# Patient Record
Sex: FEMALE | Race: WHITE | NOT HISPANIC OR LATINO
[De-identification: names, ages, dates, MRNs, and addresses within clinical notes are randomized per-mention and may not be internally consistent; named-entity substitution may affect disease eponyms.]

---

## 2017-12-13 ENCOUNTER — APPOINTMENT (OUTPATIENT)
Dept: MAMMOGRAPHY | Facility: CLINIC | Age: 67
End: 2017-12-13
Payer: MEDICARE

## 2017-12-13 ENCOUNTER — OUTPATIENT (OUTPATIENT)
Dept: OUTPATIENT SERVICES | Facility: HOSPITAL | Age: 67
LOS: 1 days | End: 2017-12-13
Payer: MEDICARE

## 2017-12-13 DIAGNOSIS — Z12.31 ENCOUNTER FOR SCREENING MAMMOGRAM FOR MALIGNANT NEOPLASM OF BREAST: ICD-10-CM

## 2017-12-13 PROBLEM — Z00.00 ENCOUNTER FOR PREVENTIVE HEALTH EXAMINATION: Status: ACTIVE | Noted: 2017-12-13

## 2017-12-13 PROCEDURE — 77063 BREAST TOMOSYNTHESIS BI: CPT | Mod: 26

## 2017-12-13 PROCEDURE — 77063 BREAST TOMOSYNTHESIS BI: CPT

## 2017-12-13 PROCEDURE — 77067 SCR MAMMO BI INCL CAD: CPT

## 2017-12-13 PROCEDURE — G0202: CPT | Mod: 26

## 2018-04-26 ENCOUNTER — APPOINTMENT (OUTPATIENT)
Dept: RHEUMATOLOGY | Facility: CLINIC | Age: 68
End: 2018-04-26
Payer: COMMERCIAL

## 2018-04-26 VITALS
OXYGEN SATURATION: 94 % | SYSTOLIC BLOOD PRESSURE: 104 MMHG | RESPIRATION RATE: 18 BRPM | TEMPERATURE: 98.3 F | WEIGHT: 104 LBS | HEIGHT: 60 IN | DIASTOLIC BLOOD PRESSURE: 70 MMHG | BODY MASS INDEX: 20.42 KG/M2 | HEART RATE: 79 BPM

## 2018-04-26 DIAGNOSIS — Z82.5 FAMILY HISTORY OF ASTHMA AND OTHER CHRONIC LOWER RESPIRATORY DISEASES: ICD-10-CM

## 2018-04-26 DIAGNOSIS — F17.200 NICOTINE DEPENDENCE, UNSPECIFIED, UNCOMPLICATED: ICD-10-CM

## 2018-04-26 DIAGNOSIS — Z83.3 FAMILY HISTORY OF DIABETES MELLITUS: ICD-10-CM

## 2018-04-26 PROCEDURE — 36415 COLL VENOUS BLD VENIPUNCTURE: CPT

## 2018-04-26 PROCEDURE — 99205 OFFICE O/P NEW HI 60 MIN: CPT | Mod: 25

## 2018-05-17 ENCOUNTER — APPOINTMENT (OUTPATIENT)
Dept: RHEUMATOLOGY | Facility: CLINIC | Age: 68
End: 2018-05-17
Payer: COMMERCIAL

## 2018-05-17 VITALS
SYSTOLIC BLOOD PRESSURE: 130 MMHG | RESPIRATION RATE: 16 BRPM | DIASTOLIC BLOOD PRESSURE: 80 MMHG | TEMPERATURE: 98.3 F | OXYGEN SATURATION: 97 % | HEART RATE: 65 BPM

## 2018-05-17 DIAGNOSIS — R89.9 UNSPECIFIED ABNORMAL FINDING IN SPECIMENS FROM OTHER ORGANS, SYSTEMS AND TISSUES: ICD-10-CM

## 2018-05-17 DIAGNOSIS — Z51.81 ENCOUNTER FOR THERAPEUTIC DRUG LVL MONITORING: ICD-10-CM

## 2018-05-17 DIAGNOSIS — H57.89 OTHER SPECIFIED DISORDERS OF EYE AND ADNEXA: ICD-10-CM

## 2018-05-17 DIAGNOSIS — G47.8 OTHER SLEEP DISORDERS: ICD-10-CM

## 2018-05-17 LAB
24R-OH-CALCIDIOL SERPL-MCNC: 50.2 PG/ML
25(OH)D3 SERPL-MCNC: 42.1 NG/ML
A PHAGOCYTOPH IGG TITR SER IF: NORMAL TITER
ACE BLD-CCNC: 45 U/L
ALBUMIN SERPL ELPH-MCNC: 4.5 G/DL
ALP BLD-CCNC: 88 U/L
ALT SERPL-CCNC: 15 U/L
ANA PAT FLD IF-IMP: ABNORMAL
ANA SER IF-ACNC: ABNORMAL
ANION GAP SERPL CALC-SCNC: 13 MMOL/L
APPEARANCE: CLEAR
AST SERPL-CCNC: 20 U/L
B BURGDOR AB SER QL IA: NEGATIVE
B BURGDOR AB SER-IMP: POSITIVE
B BURGDOR IGM PATRN SER IB-IMP: POSITIVE
B BURGDOR18/20KD IGM SER QL IB: NORMAL
B BURGDOR18KD IGG SER QL IB: PRESENT
B BURGDOR23KD IGG SER QL IB: NORMAL
B BURGDOR23KD IGM SER QL IB: PRESENT
B BURGDOR28KD AB SER QL IB: NORMAL
B BURGDOR28KD IGG SER QL IB: PRESENT
B BURGDOR30KD AB SER QL IB: NORMAL
B BURGDOR30KD IGG SER QL IB: NORMAL
B BURGDOR31KD IGG SER QL IB: NORMAL
B BURGDOR31KD IGM SER QL IB: NORMAL
B BURGDOR39KD IGG SER QL IB: PRESENT
B BURGDOR39KD IGM SER QL IB: NORMAL
B BURGDOR41KD IGG SER QL IB: NORMAL
B BURGDOR41KD IGM SER QL IB: PRESENT
B BURGDOR45KD AB SER QL IB: NORMAL
B BURGDOR45KD IGG SER QL IB: NORMAL
B BURGDOR58KD AB SER QL IB: NORMAL
B BURGDOR58KD IGG SER QL IB: PRESENT
B BURGDOR66KD IGG SER QL IB: PRESENT
B BURGDOR66KD IGM SER QL IB: NORMAL
B BURGDOR93KD IGG SER QL IB: NORMAL
B BURGDOR93KD IGM SER QL IB: NORMAL
B MICROTI IGG TITR SER: NORMAL TITER
BACTERIA: ABNORMAL
BASOPHILS # BLD AUTO: 0.02 K/UL
BASOPHILS NFR BLD AUTO: 0.4 %
BILIRUB SERPL-MCNC: 0.7 MG/DL
BILIRUBIN URINE: NEGATIVE
BLOOD URINE: NEGATIVE
BUN SERPL-MCNC: 12 MG/DL
C3 SERPL-MCNC: 98 MG/DL
C4 SERPL-MCNC: 21 MG/DL
CALCIUM SERPL-MCNC: 9.2 MG/DL
CCP AB SER IA-ACNC: <8 UNITS
CENTROMERE IGG SER-ACNC: <0.2 AL
CHLORIDE SERPL-SCNC: 103 MMOL/L
CHROMATIN AB SERPL-ACNC: 0.2 AL
CO2 SERPL-SCNC: 29 MMOL/L
COLOR: YELLOW
CREAT SERPL-MCNC: 0.68 MG/DL
CRP SERPL-MCNC: <0.2 MG/DL
DEPRECATED KAPPA LC FREE/LAMBDA SER: 0.78 RATIO
DSDNA AB SER-ACNC: 26 IU/ML
E CHAFFEENSIS IGG TITR SER IF: NORMAL TITER
ENA JO1 AB SER IA-ACNC: <0.2 AL
ENA RNP AB SER IA-ACNC: 1.7 AL
ENA SCL70 IGG SER IA-ACNC: 1.8 AL
ENA SM AB SER IA-ACNC: 1.9 AL
ENA SS-A AB SER IA-ACNC: <0.2 AL
ENA SS-B AB SER IA-ACNC: <0.2 AL
EOSINOPHIL # BLD AUTO: 0.05 K/UL
EOSINOPHIL NFR BLD AUTO: 1.1 %
ERYTHROCYTE [SEDIMENTATION RATE] IN BLOOD BY WESTERGREN METHOD: 12 MM/HR
GLUCOSE QUALITATIVE U: NEGATIVE MG/DL
GLUCOSE SERPL-MCNC: 62 MG/DL
HCT VFR BLD CALC: 41 %
HGB BLD-MCNC: 13.4 G/DL
HLA-B27 RELATED AG QL: NORMAL
HYALINE CASTS: 0 /LPF
IGA SER QL IEP: 222 MG/DL
IGG SER QL IEP: 880 MG/DL
IGM SER QL IEP: 82 MG/DL
IMM GRANULOCYTES NFR BLD AUTO: 0.2 %
KAPPA LC CSF-MCNC: 2.09 MG/DL
KAPPA LC SERPL-MCNC: 1.62 MG/DL
KETONES URINE: NEGATIVE
LEUKOCYTE ESTERASE URINE: NEGATIVE
LYMPHOCYTES # BLD AUTO: 1.34 K/UL
LYMPHOCYTES NFR BLD AUTO: 28.2 %
MAN DIFF?: NORMAL
MCHC RBC-ENTMCNC: 30.6 PG
MCHC RBC-ENTMCNC: 32.7 GM/DL
MCV RBC AUTO: 93.6 FL
MICROSCOPIC-UA: NORMAL
MONOCYTES # BLD AUTO: 0.32 K/UL
MONOCYTES NFR BLD AUTO: 6.7 %
NEUTROPHILS # BLD AUTO: 3.02 K/UL
NEUTROPHILS NFR BLD AUTO: 63.4 %
NITRITE URINE: NEGATIVE
PH URINE: 6.5
PLATELET # BLD AUTO: 175 K/UL
POTASSIUM SERPL-SCNC: 4.4 MMOL/L
PROT SERPL-MCNC: 7.1 G/DL
PROTEIN URINE: NEGATIVE MG/DL
RBC # BLD: 4.38 M/UL
RBC # FLD: 12.3 %
RED BLOOD CELLS URINE: 1 /HPF
RF+CCP IGG SER-IMP: NEGATIVE
RHEUMATOID FACT SER QL: 7 IU/ML
RIBOSOMAL P AB SER IA-ACNC: <0.2 AL
RNA POLYMERASE III IGG: 12.2 U
SODIUM SERPL-SCNC: 145 MMOL/L
SPECIFIC GRAVITY URINE: 1.02
SQUAMOUS EPITHELIAL CELLS: 8 /HPF
SSDNA AB SER-ACNC: 115 U/ML
THYROGLOB AB SERPL-ACNC: <20 IU/ML
THYROPEROXIDASE AB SERPL IA-ACNC: 13.2 IU/ML
URATE SERPL-MCNC: 1.7 MG/DL
UROBILINOGEN URINE: NEGATIVE MG/DL
WBC # FLD AUTO: 4.76 K/UL
WHITE BLOOD CELLS URINE: 1 /HPF

## 2018-05-17 PROCEDURE — 99406 BEHAV CHNG SMOKING 3-10 MIN: CPT

## 2018-05-17 PROCEDURE — 99215 OFFICE O/P EST HI 40 MIN: CPT | Mod: 25

## 2018-05-21 PROBLEM — Z51.81 ENCOUNTER FOR THERAPEUTIC DRUG MONITORING: Status: ACTIVE | Noted: 2018-05-21

## 2018-06-08 ENCOUNTER — EMERGENCY (EMERGENCY)
Facility: HOSPITAL | Age: 68
LOS: 1 days | Discharge: HOME | End: 2018-06-08
Attending: EMERGENCY MEDICINE
Payer: COMMERCIAL

## 2018-06-08 VITALS — WEIGHT: 100.09 LBS | HEIGHT: 60 IN

## 2018-06-08 VITALS
TEMPERATURE: 98 F | HEART RATE: 78 BPM | SYSTOLIC BLOOD PRESSURE: 137 MMHG | OXYGEN SATURATION: 96 % | DIASTOLIC BLOOD PRESSURE: 81 MMHG | RESPIRATION RATE: 18 BRPM

## 2018-06-08 PROCEDURE — 99283 EMERGENCY DEPT VISIT LOW MDM: CPT

## 2018-06-08 RX ADMIN — Medication 40 MILLIGRAM(S): at 10:50

## 2018-06-08 NOTE — ED PROVIDER NOTE - ATTENDING CONTRIBUTION TO CARE
swelling of eyes for the past 2 days, itchy rash: attributed to hydroxychlor which was started 4 weeks ago.  Reports increased swelling around cheek and eyes today.  denies tongue or lip swelling, denies diff breathing.  using benadryl gel with some relief.  PE:  nontoxic appearing, NARD, no lip or tongue swelliing, no wheezing, NARD.  whitley periorbital swelling.  A/P likely allergic reaction:  steroids and antihistamines.

## 2018-06-08 NOTE — ED PROVIDER NOTE - PLAN OF CARE
resolution of facial swelling complete course of oral steriods and follow up with Rheumatologist. Stop taking prescribed Hydroxychloroquine. Follow up with both PMD within a week of discharge

## 2018-06-08 NOTE — ED PROVIDER NOTE - MEDICAL DECISION MAKING DETAILS
67 year old female with likely medication induced allergic reaction. Will give 40 mg of PO steroids and discharge on 3 day course then discharge with instructions to follow up with Rheum

## 2018-06-08 NOTE — ED PROVIDER NOTE - OBJECTIVE STATEMENT
67 year old female pt with numerous autoimmune disorders presents today c/o of 3 day hx of facial swelling, itching and minor pain. Pt states she recently started on hydroxychloroquine approx 3 weeks ago. No other triggers. Denies any recent changes in lotions, detergents, soaps, food intolerances, sob, chest pain, throat closing, chest tightness, nausea or vomiting.

## 2018-06-08 NOTE — ED PROVIDER NOTE - CARE PLAN
Principal Discharge DX:	Allergic reaction to drug, initial encounter  Goal:	resolution of facial swelling  Assessment and plan of treatment:	complete course of oral steriods and follow up with Rheumatologist. Stop taking prescribed Hydroxychloroquine. Follow up with both PMD within a week of discharge

## 2018-06-08 NOTE — ED ADULT NURSE NOTE - OBJECTIVE STATEMENT
Patient reports facial swelling, burning, redness and puffness, x 3days , started new medication about 2 weeks go, airway patient, cant not take benadryl.

## 2018-07-01 ENCOUNTER — MOBILE ON CALL (OUTPATIENT)
Age: 68
End: 2018-07-01

## 2018-07-02 ENCOUNTER — APPOINTMENT (OUTPATIENT)
Dept: PULMONOLOGY | Facility: CLINIC | Age: 68
End: 2018-07-02
Payer: COMMERCIAL

## 2018-07-02 ENCOUNTER — APPOINTMENT (OUTPATIENT)
Dept: RHEUMATOLOGY | Facility: CLINIC | Age: 68
End: 2018-07-02
Payer: COMMERCIAL

## 2018-07-02 VITALS
OXYGEN SATURATION: 91 % | SYSTOLIC BLOOD PRESSURE: 120 MMHG | DIASTOLIC BLOOD PRESSURE: 76 MMHG | TEMPERATURE: 98.6 F | HEART RATE: 56 BPM | RESPIRATION RATE: 14 BRPM

## 2018-07-02 VITALS
HEART RATE: 56 BPM | SYSTOLIC BLOOD PRESSURE: 120 MMHG | RESPIRATION RATE: 14 BRPM | OXYGEN SATURATION: 91 % | DIASTOLIC BLOOD PRESSURE: 76 MMHG | TEMPERATURE: 98.6 F

## 2018-07-02 DIAGNOSIS — R05 COUGH: ICD-10-CM

## 2018-07-02 DIAGNOSIS — R06.02 SHORTNESS OF BREATH: ICD-10-CM

## 2018-07-02 DIAGNOSIS — J44.9 CHRONIC OBSTRUCTIVE PULMONARY DISEASE, UNSPECIFIED: ICD-10-CM

## 2018-07-02 DIAGNOSIS — R53.82 CHRONIC FATIGUE, UNSPECIFIED: ICD-10-CM

## 2018-07-02 PROCEDURE — 99204 OFFICE O/P NEW MOD 45 MIN: CPT

## 2018-07-02 PROCEDURE — 99215 OFFICE O/P EST HI 40 MIN: CPT | Mod: 25

## 2018-07-02 PROCEDURE — 36415 COLL VENOUS BLD VENIPUNCTURE: CPT

## 2018-07-04 ENCOUNTER — MOBILE ON CALL (OUTPATIENT)
Age: 68
End: 2018-07-04

## 2018-07-25 PROBLEM — L93.0 DISCOID LUPUS ERYTHEMATOSUS: Chronic | Status: ACTIVE | Noted: 2018-06-08

## 2018-07-26 ENCOUNTER — APPOINTMENT (OUTPATIENT)
Dept: OPHTHALMOLOGY | Facility: CLINIC | Age: 68
End: 2018-07-26
Payer: COMMERCIAL

## 2018-07-26 DIAGNOSIS — Z51.81 ENCOUNTER FOR THERAPEUTIC DRUG LVL MONITORING: ICD-10-CM

## 2018-07-26 PROCEDURE — 92134 CPTRZ OPH DX IMG PST SGM RTA: CPT

## 2018-07-26 PROCEDURE — 92002 INTRM OPH EXAM NEW PATIENT: CPT

## 2018-07-26 PROCEDURE — 92083 EXTENDED VISUAL FIELD XM: CPT

## 2018-07-31 ENCOUNTER — APPOINTMENT (OUTPATIENT)
Dept: RHEUMATOLOGY | Facility: CLINIC | Age: 68
End: 2018-07-31
Payer: COMMERCIAL

## 2018-07-31 VITALS
DIASTOLIC BLOOD PRESSURE: 80 MMHG | OXYGEN SATURATION: 96 % | RESPIRATION RATE: 15 BRPM | SYSTOLIC BLOOD PRESSURE: 117 MMHG | HEART RATE: 86 BPM

## 2018-07-31 DIAGNOSIS — Z79.899 OTHER LONG TERM (CURRENT) DRUG THERAPY: ICD-10-CM

## 2018-07-31 DIAGNOSIS — H65.01 ACUTE SEROUS OTITIS MEDIA, RIGHT EAR: ICD-10-CM

## 2018-07-31 PROCEDURE — 99215 OFFICE O/P EST HI 40 MIN: CPT

## 2018-07-31 RX ORDER — PREDNISONE 10 MG/1
10 TABLET ORAL
Qty: 20 | Refills: 0 | Status: DISCONTINUED | COMMUNITY
Start: 2018-07-01 | End: 2018-07-31

## 2018-07-31 RX ORDER — ATORVASTATIN CALCIUM 10 MG/1
10 TABLET, FILM COATED ORAL
Refills: 0 | Status: ACTIVE | COMMUNITY

## 2018-08-07 PROBLEM — Z79.899 LONG-TERM USE OF PLAQUENIL: Status: ACTIVE | Noted: 2018-07-26

## 2018-10-26 ENCOUNTER — RX RENEWAL (OUTPATIENT)
Age: 68
End: 2018-10-26

## 2018-12-04 ENCOUNTER — APPOINTMENT (OUTPATIENT)
Dept: OPHTHALMOLOGY | Facility: CLINIC | Age: 68
End: 2018-12-04
Payer: COMMERCIAL

## 2018-12-04 PROCEDURE — 92012 INTRM OPH EXAM EST PATIENT: CPT | Mod: 25

## 2018-12-04 PROCEDURE — 68761 CLOSE TEAR DUCT OPENING: CPT | Mod: E2,E4

## 2018-12-06 ENCOUNTER — APPOINTMENT (OUTPATIENT)
Dept: RHEUMATOLOGY | Facility: CLINIC | Age: 68
End: 2018-12-06

## 2018-12-28 ENCOUNTER — APPOINTMENT (OUTPATIENT)
Dept: RHEUMATOLOGY | Facility: CLINIC | Age: 68
End: 2018-12-28
Payer: COMMERCIAL

## 2018-12-28 VITALS
SYSTOLIC BLOOD PRESSURE: 102 MMHG | WEIGHT: 99 LBS | DIASTOLIC BLOOD PRESSURE: 60 MMHG | HEIGHT: 60 IN | OXYGEN SATURATION: 92 % | BODY MASS INDEX: 19.44 KG/M2 | HEART RATE: 65 BPM | TEMPERATURE: 98.1 F

## 2018-12-28 PROCEDURE — 99215 OFFICE O/P EST HI 40 MIN: CPT | Mod: 25

## 2018-12-28 PROCEDURE — 36415 COLL VENOUS BLD VENIPUNCTURE: CPT

## 2018-12-28 RX ORDER — HYDROXYCHLOROQUINE SULFATE 200 MG/1
200 TABLET, FILM COATED ORAL DAILY
Qty: 30 | Refills: 3 | Status: DISCONTINUED | COMMUNITY
Start: 2018-05-17 | End: 2018-12-28

## 2018-12-28 RX ORDER — CIPROFLOXACIN AND DEXAMETHASONE 3; 1 MG/ML; MG/ML
0.3-0.1 SUSPENSION/ DROPS AURICULAR (OTIC)
Qty: 1 | Refills: 0 | Status: DISCONTINUED | COMMUNITY
Start: 2018-07-31 | End: 2018-12-28

## 2018-12-28 RX ORDER — FLUOROMETHOLONE 1 MG/ML
0.1 SOLUTION/ DROPS OPHTHALMIC 3 TIMES DAILY
Qty: 1 | Refills: 1 | Status: DISCONTINUED | COMMUNITY
Start: 2018-07-26 | End: 2018-12-28

## 2019-01-17 ENCOUNTER — APPOINTMENT (OUTPATIENT)
Dept: OPHTHALMOLOGY | Facility: CLINIC | Age: 69
End: 2019-01-17
Payer: COMMERCIAL

## 2019-01-17 DIAGNOSIS — H01.02A SQUAMOUS BLEPHARITIS RIGHT EYE, UPPER AND LOWER EYELIDS: ICD-10-CM

## 2019-01-17 DIAGNOSIS — H01.02B SQUAMOUS BLEPHARITIS RIGHT EYE, UPPER AND LOWER EYELIDS: ICD-10-CM

## 2019-01-17 PROCEDURE — 92012 INTRM OPH EXAM EST PATIENT: CPT | Mod: 25

## 2019-01-17 PROCEDURE — 68761 CLOSE TEAR DUCT OPENING: CPT | Mod: E1,E3

## 2019-01-22 PROBLEM — H01.02A SQUAMOUS BLEPHARITIS OF UPPER AND LOWER EYELIDS OF BOTH EYES: Status: ACTIVE | Noted: 2018-07-26

## 2019-01-24 LAB
ALBUMIN MFR SERPL ELPH: 61.3 %
ALBUMIN MFR SERPL ELPH: 62.2 %
ALBUMIN SERPL ELPH-MCNC: 4.3 G/DL
ALBUMIN SERPL-MCNC: 4.2 G/DL
ALBUMIN SERPL-MCNC: 4.5 G/DL
ALBUMIN/GLOB SERPL: 1.6 RATIO
ALBUMIN/GLOB SERPL: 1.7 RATIO
ALP BLD-CCNC: 82 U/L
ALPHA1 GLOB MFR SERPL ELPH: 4 %
ALPHA1 GLOB MFR SERPL ELPH: 4.1 %
ALPHA1 GLOB SERPL ELPH-MCNC: 0.3 G/DL
ALPHA1 GLOB SERPL ELPH-MCNC: 0.3 G/DL
ALPHA2 GLOB MFR SERPL ELPH: 10.5 %
ALPHA2 GLOB MFR SERPL ELPH: 11.3 %
ALPHA2 GLOB SERPL ELPH-MCNC: 0.7 G/DL
ALPHA2 GLOB SERPL ELPH-MCNC: 0.8 G/DL
ALT SERPL-CCNC: 18 U/L
ANCA AB SER-IMP: ABNORMAL
ANION GAP SERPL CALC-SCNC: 11 MMOL/L
APPEARANCE: CLEAR
APPEARANCE: CLEAR
AST SERPL-CCNC: 22 U/L
B-GLOBULIN MFR SERPL ELPH: 10.9 %
B-GLOBULIN MFR SERPL ELPH: 11 %
B-GLOBULIN SERPL ELPH-MCNC: 0.7 G/DL
B-GLOBULIN SERPL ELPH-MCNC: 0.8 G/DL
BACTERIA: NEGATIVE
BACTERIA: NEGATIVE
BASOPHILS # BLD AUTO: 0.03 K/UL
BASOPHILS NFR BLD AUTO: 0.7 %
BILIRUB SERPL-MCNC: 0.6 MG/DL
BILIRUBIN URINE: NEGATIVE
BILIRUBIN URINE: NEGATIVE
BLOOD URINE: NEGATIVE
BLOOD URINE: NEGATIVE
BUN SERPL-MCNC: 19 MG/DL
C-ANCA SER-ACNC: NEGATIVE
C1INH SERPL-MCNC: 39 MG/DL
C1Q SERPL-MCNC: 18 MG/DL
C2 SERPL-MCNC: 3.4 MG/DL
C3 SERPL-MCNC: 117 MG/DL
C3 SERPL-MCNC: 98 MG/DL
C3D SERPL-MCNC: <12
C4 SERPL-MCNC: 21 MG/DL
C4 SERPL-MCNC: 23 MG/DL
CA VI IGA AB: 6.9 EU/ML
CA VI IGA AB: NORMAL
CA VI IGG AB: 26.8 EU/ML
CA VI IGG AB: 5.3 EU/ML
CA VI IGM AB: 2.1 EU/ML
CA VI IGM AB: 4 EU/ML
CALCIUM OXALATE CRYSTALS: ABNORMAL
CALCIUM OXALATE CRYSTALS: ABNORMAL
CALCIUM SERPL-MCNC: 9.4 MG/DL
CENTROMERE IGG SER-ACNC: <0.2 AL
CHLORIDE SERPL-SCNC: 102 MMOL/L
CHROMATIN AB SERPL-ACNC: 0.2 AL
CO2 SERPL-SCNC: 31 MMOL/L
COLOR: YELLOW
COLOR: YELLOW
CREAT SERPL-MCNC: 0.66 MG/DL
CREAT SPEC-SCNC: 168 MG/DL
CREAT SPEC-SCNC: 179 MG/DL
CREAT/PROT UR: 0.1 RATIO
CREAT/PROT UR: 0.1 RATIO
CRP SERPL-MCNC: <0.1 MG/DL
DEPRECATED KAPPA LC FREE/LAMBDA SER: 0.72 RATIO
DEPRECATED KAPPA LC FREE/LAMBDA SER: 1.14 RATIO
DSDNA AB SER-ACNC: 14 IU/ML
DSDNA AB SER-ACNC: 34 IU/ML
ENA RNP AB SER IA-ACNC: 1.1 AL
ENA RNP AB SER IA-ACNC: 1.7 AL
ENA SCL70 IGG SER IA-ACNC: 1.5 AL
ENA SCL70 IGG SER IA-ACNC: 1.9 AL
ENA SM AB SER IA-ACNC: 1.6 AL
ENA SM AB SER IA-ACNC: 1.9 AL
EOSINOPHIL # BLD AUTO: 0.05 K/UL
EOSINOPHIL NFR BLD AUTO: 1.2 %
ERYTHROCYTE [SEDIMENTATION RATE] IN BLOOD BY WESTERGREN METHOD: 4 MM/HR
GAMMA GLOB FLD ELPH-MCNC: 0.8 G/DL
GAMMA GLOB FLD ELPH-MCNC: 0.9 G/DL
GAMMA GLOB MFR SERPL ELPH: 12.3 %
GAMMA GLOB MFR SERPL ELPH: 12.4 %
GLUCOSE QUALITATIVE U: NEGATIVE MG/DL
GLUCOSE QUALITATIVE U: NEGATIVE MG/DL
GLUCOSE SERPL-MCNC: 91 MG/DL
HCT VFR BLD CALC: 43.5 %
HGB BLD-MCNC: 13.8 G/DL
HISTAMINE BLD-MCNC: 0.38 NG/ML
HYALINE CASTS: 0 /LPF
HYALINE CASTS: 0 /LPF
IC SERPL QL C1Q BIND: 4.3 MCG EQ/ML
IGA SER QL IEP: 204 MG/DL
IGA SER QL IEP: 219 MG/DL
IGG SER QL IEP: 854 MG/DL
IGG SER QL IEP: 946 MG/DL
IGM SER QL IEP: 74 MG/DL
IGM SER QL IEP: 85 MG/DL
IMM GRANULOCYTES NFR BLD AUTO: 0 %
INTERPRETATION SERPL IEP-IMP: NORMAL
INTERPRETATION SERPL IEP-IMP: NORMAL
KAPPA LC CSF-MCNC: 1.59 MG/DL
KAPPA LC CSF-MCNC: 1.88 MG/DL
KAPPA LC SERPL-MCNC: 1.14 MG/DL
KAPPA LC SERPL-MCNC: 2.14 MG/DL
KETONES URINE: NEGATIVE
KETONES URINE: NEGATIVE
LEUKOCYTE ESTERASE URINE: NEGATIVE
LEUKOCYTE ESTERASE URINE: NEGATIVE
LYMPHOCYTES # BLD AUTO: 1.27 K/UL
LYMPHOCYTES NFR BLD AUTO: 30 %
M PROTEIN SPEC IFE-MCNC: NORMAL
M PROTEIN SPEC IFE-MCNC: NORMAL
MAN DIFF?: NORMAL
MCHC RBC-ENTMCNC: 30.1 PG
MCHC RBC-ENTMCNC: 31.7 GM/DL
MCV RBC AUTO: 95 FL
MICROSCOPIC-UA: NORMAL
MICROSCOPIC-UA: NORMAL
MONOCYTES # BLD AUTO: 0.19 K/UL
MONOCYTES NFR BLD AUTO: 4.5 %
MYELOPEROXIDASE AB SER QL IA: <5 UNITS
MYELOPEROXIDASE CELLS FLD QL: NEGATIVE
NEUTROPHILS # BLD AUTO: 2.7 K/UL
NEUTROPHILS NFR BLD AUTO: 63.6 %
NITRITE URINE: NEGATIVE
NITRITE URINE: NEGATIVE
P-ANCA TITR SER IF: NEGATIVE
PH URINE: 5
PH URINE: 6
PLATELET # BLD AUTO: 174 K/UL
POTASSIUM SERPL-SCNC: 4 MMOL/L
PROT SERPL-MCNC: 6.7 G/DL
PROT SERPL-MCNC: 7.3 G/DL
PROT SERPL-MCNC: 7.3 G/DL
PROT UR-MCNC: 14 MG/DL
PROT UR-MCNC: 23 MG/DL
PROTEIN URINE: ABNORMAL MG/DL
PROTEIN URINE: NEGATIVE MG/DL
PROTEINASE3 AB SER IA-ACNC: <5 UNITS
PROTEINASE3 AB SER-ACNC: NEGATIVE
PSP IGA AB: 5.5 EU/ML
PSP IGA AB: 5.6 EU/ML
PSP IGG AB: 1.7 EU/ML
PSP IGG AB: 2.6 EU/ML
PSP IGM AB: 15.8 EU/ML
PSP IGM AB: 4.2 EU/ML
RBC # BLD: 4.58 M/UL
RBC # FLD: 12.2 %
RED BLOOD CELLS URINE: 1 /HPF
RED BLOOD CELLS URINE: 6 /HPF
RHEUMATOID FACT SER QL: <10 IU/ML
RIBOSOMAL P AB SER IA-ACNC: <0.2 AL
RNA POLYMERASE III IGG: <10 U
RNA POLYMERASE III IGG: <10 U
SEROLOGY COMMENTS: NORMAL
SEROLOGY COMMENTS: NORMAL
SODIUM SERPL-SCNC: 144 MMOL/L
SP-1 IGA AB: 3.9 EU/ML
SP-1 IGA AB: 5.1 EU/ML
SP-1 IGG AB: 2.1 EU/ML
SP-1 IGG AB: 3.5 EU/ML
SP-1 IGM AB: 3.3 EU/ML
SP-1 IGM AB: NORMAL
SPECIFIC GRAVITY URINE: 1.02
SPECIFIC GRAVITY URINE: 1.03
SQUAMOUS EPITHELIAL CELLS: 2 /HPF
SQUAMOUS EPITHELIAL CELLS: 9 /HPF
TOTAL IGE SMQN RAST: 34 KU/L
URINE COMMENTS: NORMAL
UROBILINOGEN URINE: NEGATIVE MG/DL
UROBILINOGEN URINE: NEGATIVE MG/DL
WBC # FLD AUTO: 4.24 K/UL
WHITE BLOOD CELLS URINE: 2 /HPF
WHITE BLOOD CELLS URINE: 2 /HPF

## 2019-01-31 ENCOUNTER — APPOINTMENT (OUTPATIENT)
Dept: RHEUMATOLOGY | Facility: CLINIC | Age: 69
End: 2019-01-31
Payer: COMMERCIAL

## 2019-01-31 VITALS
WEIGHT: 98 LBS | OXYGEN SATURATION: 92 % | HEART RATE: 87 BPM | DIASTOLIC BLOOD PRESSURE: 60 MMHG | SYSTOLIC BLOOD PRESSURE: 100 MMHG | HEIGHT: 60 IN | RESPIRATION RATE: 14 BRPM | BODY MASS INDEX: 19.24 KG/M2

## 2019-01-31 DIAGNOSIS — M25.50 PAIN IN UNSPECIFIED JOINT: ICD-10-CM

## 2019-01-31 PROCEDURE — 36415 COLL VENOUS BLD VENIPUNCTURE: CPT

## 2019-01-31 PROCEDURE — 99406 BEHAV CHNG SMOKING 3-10 MIN: CPT

## 2019-01-31 PROCEDURE — 99215 OFFICE O/P EST HI 40 MIN: CPT | Mod: 25

## 2019-01-31 RX ORDER — BUPROPION HYDROCHLORIDE 100 MG/1
100 TABLET, FILM COATED, EXTENDED RELEASE ORAL
Qty: 60 | Refills: 0 | Status: COMPLETED | COMMUNITY
Start: 2018-04-16

## 2019-01-31 RX ORDER — KETOTIFEN FUMARATE 0.25 MG/ML
0.03 SOLUTION/ DROPS OPHTHALMIC
Qty: 1 | Refills: 2 | Status: DISCONTINUED | COMMUNITY
Start: 2018-12-28 | End: 2019-01-31

## 2019-02-04 PROBLEM — M25.50 ARTHRALGIA OF MULTIPLE JOINTS: Status: ACTIVE | Noted: 2018-04-26

## 2019-02-18 ENCOUNTER — APPOINTMENT (OUTPATIENT)
Dept: MAMMOGRAPHY | Facility: CLINIC | Age: 69
End: 2019-02-18
Payer: COMMERCIAL

## 2019-02-18 ENCOUNTER — OUTPATIENT (OUTPATIENT)
Dept: OUTPATIENT SERVICES | Facility: HOSPITAL | Age: 69
LOS: 1 days | End: 2019-02-18
Payer: COMMERCIAL

## 2019-02-18 DIAGNOSIS — Z12.31 ENCOUNTER FOR SCREENING MAMMOGRAM FOR MALIGNANT NEOPLASM OF BREAST: ICD-10-CM

## 2019-02-18 PROCEDURE — 77063 BREAST TOMOSYNTHESIS BI: CPT | Mod: 26

## 2019-02-18 PROCEDURE — 77067 SCR MAMMO BI INCL CAD: CPT

## 2019-02-18 PROCEDURE — 77063 BREAST TOMOSYNTHESIS BI: CPT

## 2019-02-18 PROCEDURE — 77067 SCR MAMMO BI INCL CAD: CPT | Mod: 26

## 2019-02-19 ENCOUNTER — APPOINTMENT (OUTPATIENT)
Dept: OPHTHALMOLOGY | Facility: CLINIC | Age: 69
End: 2019-02-19
Payer: COMMERCIAL

## 2019-02-19 DIAGNOSIS — H35.371 PUCKERING OF MACULA, RIGHT EYE: ICD-10-CM

## 2019-02-19 PROCEDURE — 92014 COMPRE OPH EXAM EST PT 1/>: CPT

## 2019-02-19 PROCEDURE — 92134 CPTRZ OPH DX IMG PST SGM RTA: CPT

## 2019-04-12 LAB
ALBUMIN SERPL ELPH-MCNC: 4.5 G/DL
ALP BLD-CCNC: 86 U/L
ALT SERPL-CCNC: 14 U/L
ANION GAP SERPL CALC-SCNC: 12 MMOL/L
AST SERPL-CCNC: 24 U/L
BASOPHILS # BLD AUTO: 0.02 K/UL
BASOPHILS NFR BLD AUTO: 0.5 %
BILIRUB SERPL-MCNC: 0.9 MG/DL
BUN SERPL-MCNC: 18 MG/DL
CALCIUM SERPL-MCNC: 9.5 MG/DL
CHLORIDE SERPL-SCNC: 101 MMOL/L
CO2 SERPL-SCNC: 31 MMOL/L
CREAT SERPL-MCNC: 0.79 MG/DL
CRP SERPL-MCNC: <0.1 MG/DL
EOSINOPHIL # BLD AUTO: 0.07 K/UL
EOSINOPHIL NFR BLD AUTO: 1.6 %
ERYTHROCYTE [SEDIMENTATION RATE] IN BLOOD BY WESTERGREN METHOD: 4 MM/HR
GLUCOSE SERPL-MCNC: 91 MG/DL
HCT VFR BLD CALC: 42.2 %
HGB BLD-MCNC: 13.6 G/DL
IMM GRANULOCYTES NFR BLD AUTO: 0 %
LYMPHOCYTES # BLD AUTO: 1.27 K/UL
LYMPHOCYTES NFR BLD AUTO: 28.7 %
MAN DIFF?: NORMAL
MCHC RBC-ENTMCNC: 30.4 PG
MCHC RBC-ENTMCNC: 32.2 GM/DL
MCV RBC AUTO: 94.4 FL
MONOCYTES # BLD AUTO: 0.22 K/UL
MONOCYTES NFR BLD AUTO: 5 %
NEUTROPHILS # BLD AUTO: 2.84 K/UL
NEUTROPHILS NFR BLD AUTO: 64.2 %
PLATELET # BLD AUTO: 174 K/UL
POTASSIUM SERPL-SCNC: 4.2 MMOL/L
PROT SERPL-MCNC: 6.7 G/DL
RBC # BLD: 4.47 M/UL
RBC # FLD: 12.4 %
SODIUM SERPL-SCNC: 144 MMOL/L
WBC # FLD AUTO: 4.42 K/UL

## 2019-04-26 ENCOUNTER — INPATIENT (INPATIENT)
Facility: HOSPITAL | Age: 69
LOS: 2 days | Discharge: HOME | DRG: 287 | End: 2019-04-29
Attending: FAMILY MEDICINE | Admitting: HOSPITALIST
Payer: MEDICARE

## 2019-04-26 VITALS
OXYGEN SATURATION: 94 % | WEIGHT: 100.09 LBS | RESPIRATION RATE: 18 BRPM | SYSTOLIC BLOOD PRESSURE: 160 MMHG | DIASTOLIC BLOOD PRESSURE: 80 MMHG | HEIGHT: 60 IN | HEART RATE: 87 BPM

## 2019-04-26 LAB
ALBUMIN SERPL ELPH-MCNC: 3.9 G/DL — SIGNIFICANT CHANGE UP (ref 3.3–5.2)
ALP SERPL-CCNC: 95 U/L — SIGNIFICANT CHANGE UP (ref 40–120)
ALT FLD-CCNC: 16 U/L — SIGNIFICANT CHANGE UP
ANION GAP SERPL CALC-SCNC: 12 MMOL/L — SIGNIFICANT CHANGE UP (ref 5–17)
APTT BLD: 34.3 SEC — SIGNIFICANT CHANGE UP (ref 27.5–36.3)
AST SERPL-CCNC: 27 U/L — SIGNIFICANT CHANGE UP
BASOPHILS # BLD AUTO: 0 K/UL — SIGNIFICANT CHANGE UP (ref 0–0.2)
BASOPHILS NFR BLD AUTO: 0.2 % — SIGNIFICANT CHANGE UP (ref 0–2)
BILIRUB SERPL-MCNC: 0.7 MG/DL — SIGNIFICANT CHANGE UP (ref 0.4–2)
BUN SERPL-MCNC: 16 MG/DL — SIGNIFICANT CHANGE UP (ref 8–20)
CALCIUM SERPL-MCNC: 9.4 MG/DL — SIGNIFICANT CHANGE UP (ref 8.6–10.2)
CHLORIDE SERPL-SCNC: 103 MMOL/L — SIGNIFICANT CHANGE UP (ref 98–107)
CK MB CFR SERPL CALC: 4.5 NG/ML — SIGNIFICANT CHANGE UP (ref 0–6.7)
CK SERPL-CCNC: 183 U/L — HIGH (ref 25–170)
CO2 SERPL-SCNC: 26 MMOL/L — SIGNIFICANT CHANGE UP (ref 22–29)
CREAT SERPL-MCNC: 0.5 MG/DL — SIGNIFICANT CHANGE UP (ref 0.5–1.3)
EOSINOPHIL # BLD AUTO: 0.1 K/UL — SIGNIFICANT CHANGE UP (ref 0–0.5)
EOSINOPHIL NFR BLD AUTO: 2.7 % — SIGNIFICANT CHANGE UP (ref 0–6)
GLUCOSE SERPL-MCNC: 98 MG/DL — SIGNIFICANT CHANGE UP (ref 70–115)
HCT VFR BLD CALC: 40.8 % — SIGNIFICANT CHANGE UP (ref 37–47)
HGB BLD-MCNC: 13.8 G/DL — SIGNIFICANT CHANGE UP (ref 12–16)
INR BLD: 0.94 RATIO — SIGNIFICANT CHANGE UP (ref 0.88–1.16)
LYMPHOCYTES # BLD AUTO: 1.4 K/UL — SIGNIFICANT CHANGE UP (ref 1–4.8)
LYMPHOCYTES # BLD AUTO: 33.2 % — SIGNIFICANT CHANGE UP (ref 20–55)
MCHC RBC-ENTMCNC: 31.1 PG — HIGH (ref 27–31)
MCHC RBC-ENTMCNC: 33.8 G/DL — SIGNIFICANT CHANGE UP (ref 32–36)
MCV RBC AUTO: 91.9 FL — SIGNIFICANT CHANGE UP (ref 81–99)
MONOCYTES # BLD AUTO: 0.3 K/UL — SIGNIFICANT CHANGE UP (ref 0–0.8)
MONOCYTES NFR BLD AUTO: 6.4 % — SIGNIFICANT CHANGE UP (ref 3–10)
NEUTROPHILS # BLD AUTO: 2.5 K/UL — SIGNIFICANT CHANGE UP (ref 1.8–8)
NEUTROPHILS NFR BLD AUTO: 57.5 % — SIGNIFICANT CHANGE UP (ref 37–73)
NT-PROBNP SERPL-SCNC: 43 PG/ML — SIGNIFICANT CHANGE UP (ref 0–300)
PLATELET # BLD AUTO: 140 K/UL — LOW (ref 150–400)
POTASSIUM SERPL-MCNC: 4.1 MMOL/L — SIGNIFICANT CHANGE UP (ref 3.5–5.3)
POTASSIUM SERPL-SCNC: 4.1 MMOL/L — SIGNIFICANT CHANGE UP (ref 3.5–5.3)
PROT SERPL-MCNC: 6.9 G/DL — SIGNIFICANT CHANGE UP (ref 6.6–8.7)
PROTHROM AB SERPL-ACNC: 10.8 SEC — SIGNIFICANT CHANGE UP (ref 10–12.9)
RBC # BLD: 4.44 M/UL — SIGNIFICANT CHANGE UP (ref 4.4–5.2)
RBC # FLD: 11.8 % — SIGNIFICANT CHANGE UP (ref 11–15.6)
SODIUM SERPL-SCNC: 141 MMOL/L — SIGNIFICANT CHANGE UP (ref 135–145)
TROPONIN T SERPL-MCNC: <0.01 NG/ML — SIGNIFICANT CHANGE UP (ref 0–0.06)
WBC # BLD: 4.4 K/UL — LOW (ref 4.8–10.8)
WBC # FLD AUTO: 4.4 K/UL — LOW (ref 4.8–10.8)

## 2019-04-26 PROCEDURE — 93010 ELECTROCARDIOGRAM REPORT: CPT

## 2019-04-26 PROCEDURE — 71275 CT ANGIOGRAPHY CHEST: CPT | Mod: 26

## 2019-04-26 PROCEDURE — 71046 X-RAY EXAM CHEST 2 VIEWS: CPT | Mod: 26

## 2019-04-26 PROCEDURE — 99218: CPT

## 2019-04-26 RX ORDER — ATORVASTATIN CALCIUM 80 MG/1
5 TABLET, FILM COATED ORAL AT BEDTIME
Qty: 0 | Refills: 0 | Status: DISCONTINUED | OUTPATIENT
Start: 2019-04-26 | End: 2019-04-29

## 2019-04-26 RX ORDER — ASPIRIN/CALCIUM CARB/MAGNESIUM 324 MG
81 TABLET ORAL DAILY
Qty: 0 | Refills: 0 | Status: DISCONTINUED | OUTPATIENT
Start: 2019-04-26 | End: 2019-04-29

## 2019-04-26 RX ADMIN — Medication 81 MILLIGRAM(S): at 22:31

## 2019-04-26 RX ADMIN — ATORVASTATIN CALCIUM 5 MILLIGRAM(S): 80 TABLET, FILM COATED ORAL at 23:00

## 2019-04-26 NOTE — ED PROVIDER NOTE - CLINICAL SUMMARY MEDICAL DECISION MAKING FREE TEXT BOX
Case discussed with KIMBERLY Serna, requesting placement on Observation, and CTA chest and will see pt as consult in AM

## 2019-04-26 NOTE — ED ADULT NURSE NOTE - OBJECTIVE STATEMENT
Assumed pt care, pt lying in stretcher, watching TV, daughter at bedside, no acute distress noted. Pt states she went to urgent care for chest discomfort x1 week, and was sent to ER. Pt states it feels as if there's a knot in her chest, more like a pressure uncomfortable feeling, denies pain. Pt denies SOB, dyspnea. Pt placed on cardiac monitor and  prior to RN assessment. Pt VS as noted.

## 2019-04-26 NOTE — ED ADULT NURSE NOTE - CAS EDN DISCHARGE ASSESSMENT
Patient A&Ox3. No s/s of distress or pain. NSR on CM, other VSS. Ambulatory. Patient verbalizes understanding of plan of care./Alert and oriented to person, place and time

## 2019-04-26 NOTE — ED PROVIDER NOTE - PROGRESS NOTE DETAILS
Case d/w Cardio/Dr. jody Maldonado and will place on OBS, monitor check  CTA chest, TTE, serial Trop and cardio josue cinthya pt in AM.  Case d/w pt and she is agreeable.  Case d/w CDU PA/Kyler and will follow pt in  CDU

## 2019-04-26 NOTE — ED PROVIDER NOTE - OBJECTIVE STATEMENT
67 y/o F pt with hx of COPD, HLD, scleroderma, lupus, and Sjogren's presents to ED c/o central, squeezing CP and back pain that began 1 week ago. Pt went to urgent care today and was sent to the ED for further work-up. CP is described as a knot in the center of her chest and back pain spreads across her shoulder blades. She also note fatigue and mild HERNANDEZ. She has never had these symptoms before. Pt has not seen a cardiologist in the last 10 years. Pt had a stress test a "few" years ago, unsure of exact time. Denies fever, chills, SOB, abd pain, n/v and diaphoresis. No further complaints at this time.   Cardio: [facility]

## 2019-04-26 NOTE — ED STATDOCS - PROGRESS NOTE DETAILS
68 y.o F with PMHx HLD, lupus, Sjogren's syndrome, COPD presents to ED from urgent care c/o a constant "knot" in her chest, upper back pain, fatigue x1 week. Exertion makes the pt feel "winded". Currently not on any medications for lupus because the last medication she was on made her hair fall out so she stopped taking it. When she went to f/u with her rheumatologist they no longer accepted he insurance. Pt was hypertensive at urgent care but she has had no history of HTN. Pt is an active smoker.   On exam: lungs clear, heart RRR  Pt will be sent to Main ED on monitor for further treatment and evaluation.

## 2019-04-26 NOTE — ED ADULT NURSE NOTE - NSIMPLEMENTINTERV_GEN_ALL_ED
Implemented All Universal Safety Interventions:  [place] to call system. Call bell, personal items and telephone within reach. Instruct patient to call for assistance. Room bathroom lighting operational. Non-slip footwear when patient is off stretcher. Physically safe environment: no spills, clutter or unnecessary equipment. Stretcher in lowest position, wheels locked, appropriate side rails in place.

## 2019-04-26 NOTE — ED CDU PROVIDER INITIAL DAY NOTE - OBJECTIVE STATEMENT
69 y/o F pt with hx of COPD, HLD, scleroderma, lupus, and Sjogren's presents to ED c/o central, squeezing CP and back pain that began 1 week ago. Pt went to urgent care today and was sent to the ED for further work-up. CP is described as a knot in the center of her chest and back pain spreads across her shoulder blades. She also note fatigue and mild HERNANDEZ. She has never had these symptoms before. Pt has not seen a cardiologist in the last 10 years. Pt had a stress test a "few" years ago, unsure of exact time. Denies fever, chills, SOB, abd pain, n/v and diaphoresis. No further complaints at this time.   Cardio: [facility]

## 2019-04-26 NOTE — ED ADULT NURSE NOTE - CHPI ED NUR SYMPTOMS NEG
no nausea/no vomiting/no chills/no diaphoresis/no shortness of breath/no dizziness/no fever/no congestion/no syncope/no back pain

## 2019-04-26 NOTE — ED ADULT TRIAGE NOTE - CHIEF COMPLAINT QUOTE
Patient A&Ox4 complaining of chest tightness x1 week & went to urgent care & was told she "may have fluid around her heart." Denies any shortness of breath or dizziness. Stated gets lightheaded when going from lying to sitting position.

## 2019-04-27 DIAGNOSIS — Z98.890 OTHER SPECIFIED POSTPROCEDURAL STATES: Chronic | ICD-10-CM

## 2019-04-27 DIAGNOSIS — I24.9 ACUTE ISCHEMIC HEART DISEASE, UNSPECIFIED: ICD-10-CM

## 2019-04-27 DIAGNOSIS — Z90.710 ACQUIRED ABSENCE OF BOTH CERVIX AND UTERUS: Chronic | ICD-10-CM

## 2019-04-27 LAB — TROPONIN T SERPL-MCNC: <0.01 NG/ML — SIGNIFICANT CHANGE UP (ref 0–0.06)

## 2019-04-27 PROCEDURE — 99217: CPT

## 2019-04-27 PROCEDURE — 99223 1ST HOSP IP/OBS HIGH 75: CPT

## 2019-04-27 PROCEDURE — 93306 TTE W/DOPPLER COMPLETE: CPT | Mod: 26

## 2019-04-27 RX ORDER — KETOROLAC TROMETHAMINE 30 MG/ML
15 SYRINGE (ML) INJECTION ONCE
Qty: 0 | Refills: 0 | Status: DISCONTINUED | OUTPATIENT
Start: 2019-04-27 | End: 2019-04-27

## 2019-04-27 RX ORDER — SACCHAROMYCES BOULARDII 250 MG
250 POWDER IN PACKET (EA) ORAL
Qty: 0 | Refills: 0 | Status: DISCONTINUED | OUTPATIENT
Start: 2019-04-27 | End: 2019-04-29

## 2019-04-27 RX ORDER — ENOXAPARIN SODIUM 100 MG/ML
40 INJECTION SUBCUTANEOUS DAILY
Qty: 0 | Refills: 0 | Status: DISCONTINUED | OUTPATIENT
Start: 2019-04-27 | End: 2019-04-29

## 2019-04-27 RX ORDER — PANTOPRAZOLE SODIUM 20 MG/1
40 TABLET, DELAYED RELEASE ORAL DAILY
Qty: 0 | Refills: 0 | Status: DISCONTINUED | OUTPATIENT
Start: 2019-04-27 | End: 2019-04-29

## 2019-04-27 RX ORDER — IPRATROPIUM/ALBUTEROL SULFATE 18-103MCG
3 AEROSOL WITH ADAPTER (GRAM) INHALATION EVERY 6 HOURS
Qty: 0 | Refills: 0 | Status: DISCONTINUED | OUTPATIENT
Start: 2019-04-27 | End: 2019-04-29

## 2019-04-27 RX ORDER — ALPRAZOLAM 0.25 MG
0.25 TABLET ORAL
Qty: 0 | Refills: 0 | Status: DISCONTINUED | OUTPATIENT
Start: 2019-04-27 | End: 2019-04-29

## 2019-04-27 RX ORDER — NITROGLYCERIN 6.5 MG
0.4 CAPSULE, EXTENDED RELEASE ORAL
Qty: 0 | Refills: 0 | Status: DISCONTINUED | OUTPATIENT
Start: 2019-04-27 | End: 2019-04-29

## 2019-04-27 RX ADMIN — Medication 81 MILLIGRAM(S): at 13:05

## 2019-04-27 RX ADMIN — PANTOPRAZOLE SODIUM 40 MILLIGRAM(S): 20 TABLET, DELAYED RELEASE ORAL at 13:07

## 2019-04-27 RX ADMIN — Medication 15 MILLIGRAM(S): at 13:07

## 2019-04-27 RX ADMIN — Medication 0.25 MILLIGRAM(S): at 18:36

## 2019-04-27 RX ADMIN — Medication 15 MILLIGRAM(S): at 13:45

## 2019-04-27 RX ADMIN — ENOXAPARIN SODIUM 40 MILLIGRAM(S): 100 INJECTION SUBCUTANEOUS at 13:07

## 2019-04-27 NOTE — ED CDU PROVIDER SUBSEQUENT DAY NOTE - MEDICAL DECISION MAKING DETAILS
68 year old female with chest tightness radiating to back  CT A and TTE and ED chest pain eval. will continue to monitor on tele and re-eval

## 2019-04-27 NOTE — CONSULT NOTE ADULT - SUBJECTIVE AND OBJECTIVE BOX
[facility] [place] [facility] - [facility], THE HEART CENTER                                   [address] [place] [address]                                                      PHONE: [phone]                                                         FAX: [phone]  [email or web address]  ---------------------------------------------------------------------------------------------------------------------------------    Reason for Consult: CP/UA    HPI:  ALMA SAUNDERS is an 68y Female h/o current smoker 1 PPD, carotid disease, HLD, COPD, and recent dx of scleroderma, lupus, and Sjogrens presents c/o intermittent CP and generalized fatigue.  Some CP overnight.    PAST MEDICAL & SURGICAL HISTORY:  Lupus  Anxiety  COPD (chronic obstructive pulmonary disease)  No significant past surgical history      penicillin (Rash)  Sulfacetamide Sodium (Rash)      MEDICATIONS  (STANDING):  aspirin  chewable 81 milliGRAM(s) Oral daily  atorvastatin 5 milliGRAM(s) Oral at bedtime    MEDICATIONS  (PRN):      Social History:  Cigarettes:     yes    Alchohol:  no               Illicit Drug Abuse:  no    ROS: Negative other than as mentioned in HPI.    Vital Signs Last 24 Hrs  T(C): 37 (27 Apr 2019 07:37), Max: 37 (27 Apr 2019 07:37)  T(F): 98.6 (27 Apr 2019 07:37), Max: 98.6 (27 Apr 2019 07:37)  HR: 74 (27 Apr 2019 07:37) (71 - 91)  BP: 138/74 (27 Apr 2019 07:37) (119/60 - 160/80)  BP(mean): --  RR: 18 (27 Apr 2019 07:37) (18 - 18)  SpO2: 96% (27 Apr 2019 07:37) (94% - 96%)  ICU Vital Signs Last 24 Hrs  ALMA SAUNDERS  I&O's Detail    I&O's Summary    Drug Dosing Weight  ALMA SAUNDERS      PHYSICAL EXAM:  General: NAD.  HEENT: Head; normocephalic.  Eyes: Pupils reactive.  Neck: Supple.  CARDIOVASCULAR: Normal S1 and S2.   LUNGS: Normal breath sounds bilaterally.  ABDOMEN: Soft, nontender.  EXTREMITIES: No clubbing, cyanosis or edema.   SKIN: warm.  NEURO: Alert/oriented x 3.    PSYCH: normal affect.        LABS:                        13.8   4.4   )-----------( 140      ( 26 Apr 2019 21:57 )             40.8     04-26    141  |  103  |  16.0  ----------------------------<  98  4.1   |  26.0  |  0.50    Ca    9.4      26 Apr 2019 21:57    TPro  6.9  /  Alb  3.9  /  TBili  0.7  /  DBili  x   /  AST  27  /  ALT  16  /  AlkPhos  95  04-26    ALMA SAUNDERS  CARDIAC MARKERS ( 27 Apr 2019 04:38 )  x     / <0.01 ng/mL / x     / x     / x      CARDIAC MARKERS ( 26 Apr 2019 21:57 )  x     / <0.01 ng/mL / 183 U/L / x     / 4.5 ng/mL      PT/INR - ( 26 Apr 2019 21:57 )   PT: 10.8 sec;   INR: 0.94 ratio         PTT - ( 26 Apr 2019 21:57 )  PTT:34.3 sec      RADIOLOGY & ADDITIONAL STUDIES:    INTERPRETATION OF TELEMETRY (personally reviewed): NSR    ECG: NSR 84      Assessment and Plan:  68y Female h/o current smoker 1 PPD, carotid disease, HLD, COPD, and recent dx of scleroderma, lupus, and Sjogrens presents c/o intermittent CP and generalized fatigue concerning for UA.    1. Continue to monitor telemetry.  2. Trend CE's.  3. Serial 12 lead EKGs.  4. Obtain 2D echo.  5. Recommend cardiac cath on Monday to evaluate coronary anatomy.

## 2019-04-27 NOTE — H&P ADULT - NSICDXFAMILYHX_GEN_ALL_CORE_FT
FAMILY HISTORY:  FH: COPD (chronic obstructive pulmonary disease), mother  at age 83  FH: heart disease, dad  at age 63

## 2019-04-27 NOTE — ED CDU PROVIDER SUBSEQUENT DAY NOTE - CARDIAC, MLM
Normal rate, regular rhythm.  Heart sounds S1, S2.  No murmurs, rubs or gallops. no reproducible chest pain with palpation

## 2019-04-27 NOTE — ED CDU PROVIDER DISPOSITION NOTE - CLINICAL COURSE
68y Female h/o current smoker 1 PPD, carotid disease, HLD, COPD, and recent dx of scleroderma, lupus, and Sjogrens presents c/o intermittent CP and generalized fatigue.  Pt had some CP overnight- seen by cardio and is recommending cardiac cath monday- will admit

## 2019-04-27 NOTE — H&P ADULT - ASSESSMENT
69 yo female heavy smoker , h/ autoimmune vascular disease ( lupus , scleroderma , Sjogren's ) admitted for ACS     1- ACS - active smoker   multiple risk factors   cardiology input appreciated   TTE ordered, cont aspirin statin , likely LHC on Monday r/o CAD   cnt cardiac monitor     2- COPD -will start  neb therapy   CT of chest with right lung opacities   will give empirical abx levaquin for 5 days   incentive spirometry   oxygen as needed   3- Tobacco use , cronic addiction   counseling given to quit smoking and risks

## 2019-04-27 NOTE — ED CDU PROVIDER DISPOSITION NOTE - ATTENDING CONTRIBUTION TO CARE
Zack CARLTON- 69 Y/O F with h/o copd, active smoker , lupus placed in cdu for chest pain radiating to back with neg cta for acute pathology and seen by cardiology which recommends cath . Pt is feeling subjectively better but is concerned about not smoking and feeling restless. Pt was advised not to valdo nicotine by cardiology    pt is alert, well appearing female, s1s2 normal reg b/l bronchial cough but no wheezing, abd soft, nt, neuro exam aox3, cn 2-12 intact, skin warm, dry, good turgor, vitals stable    plan to admit to tele, will try xanax fro anxiety from non smoking

## 2019-04-27 NOTE — H&P ADULT - HISTORY OF PRESENT ILLNESS
69 y/o F pt with hx of COPD, HLD, scleroderma, lupus, and Sjogren's presents to ED yesterday with c/o central, lower sternal  squeezing CP radiating to her back shoulder blade bilaterally . Pt syas pain has been constatnt on off worsening for about 1 week associated with dizziness and feeling extremely fatigue any exertion . She also has mild cough cronic and shortness of breath after intense exertion . She describes the pain like  a knot in the center of her chest and back pain spreads across her shoulder blades.  She has never had these symptoms before. Pt has not seen a cardiologist in the last 10 years. Pt had a stress test a "few" years ago, unsure of exact time. Denies fever, chills, SOB, abd pain, n/v and diaphoresis. No further complaints at this time.   She went to urgent care before coming to the ED and send for further work up, pain is better today kept on observation unit seen by cardiologist thsi morning and decided to admit for LHC on Monday . Pt is smoker heavy for over 58 years

## 2019-04-27 NOTE — ED CDU PROVIDER SUBSEQUENT DAY NOTE - HISTORY
68 year old female COPD with new onset chest tightness that radiates to the back. pt remains on monitor and pending labs and imaging

## 2019-04-27 NOTE — ED CDU PROVIDER SUBSEQUENT DAY NOTE - ATTENDING CONTRIBUTION TO CARE
Zack CARLTON- 67 Y/O F with h/o copd, active smoker , lupus placed in cdu for chest pain radiating to back with neg cta for acute pathology and seen by cardiology which recommends cath . Pt is feeling subjectively better but is concerned about not smoking and feeling restless. Pt was advised not to valdo nicotine by cardiology    pt is alert, well appearing female, s1s2 normal reg b/l bronchial cough but no wheezing, abd soft, nt, neuro exam aox3, cn 2-12 intact, skin warm, dry, good turgor, vitals stable    plan to admit to tele, will try xanax fro anxiety from non smoking

## 2019-04-27 NOTE — H&P ADULT - NSHPLABSRESULTS_GEN_ALL_CORE
13.8   4.4   )-----------( 140      ( 26 Apr 2019 21:57 )             40.8   04-26    141  |  103  |  16.0  ----------------------------<  98  4.1   |  26.0  |  0.50    Ca    9.4      26 Apr 2019 21:57    TPro  6.9  /  Alb  3.9  /  TBili  0.7  /  DBili  x   /  AST  27  /  ALT  16  /  AlkPhos  95  04-26    < from: CT Angio Chest w/ IV Cont (04.26.19 @ 23:24)   < from: CT Angio Chest w/ IV Cont (04.26.19 @ 23:24) >      No pulmonary embolus.    Nonspecific small ground glass opacity in the right upper lobe, which may   represent a focus of infection or inflammation although neoplasm cannot   be excluded. Recommend a 6-12 month follow-up.    Additional findings as described.

## 2019-04-27 NOTE — ED ADULT NURSE REASSESSMENT NOTE - NS ED NURSE REASSESS COMMENT FT1
Report given to obs GERRY Foote. As per RN she will send aide to take pt to CDU 6. Pt handed off in stable condition, no acute distress noted at that time.
Pt updated that she is awaiting echo and cardiology consult this am. Pt resting comfortably in stretcher, denies pain or discomfort. Pt able to successfully teach back plan of care to RN. RN will continue to update pt throughout ED stay.
Assumed care of the patient at 0730. Patient A&Ox3. No s/s of distress or pain. Patient moved to observation unit cdu 6. Patient placed on CM, NSR on CM. States had persistent CP since 1 week. Now CP subsided. Denies SOB. Lungs clear B/L on auscultation. Abdomen soft and nondistended. BS+. No peripheral edema noted. Ambulatory. Awaiting cardiology consult and Echo testing. VSS. All questions and concerns addressed. Patient verbalizes understanding of plan of care. Will continue to monitor.

## 2019-04-28 LAB
HCV AB S/CO SERPL IA: 0.09 S/CO — SIGNIFICANT CHANGE UP (ref 0–0.99)
HCV AB SERPL-IMP: SIGNIFICANT CHANGE UP

## 2019-04-28 PROCEDURE — 99232 SBSQ HOSP IP/OBS MODERATE 35: CPT

## 2019-04-28 RX ADMIN — Medication 81 MILLIGRAM(S): at 11:51

## 2019-04-28 RX ADMIN — ATORVASTATIN CALCIUM 5 MILLIGRAM(S): 80 TABLET, FILM COATED ORAL at 21:25

## 2019-04-28 RX ADMIN — Medication 250 MILLIGRAM(S): at 05:16

## 2019-04-28 RX ADMIN — Medication 0.25 MILLIGRAM(S): at 09:07

## 2019-04-28 RX ADMIN — Medication 0.25 MILLIGRAM(S): at 21:24

## 2019-04-28 RX ADMIN — PANTOPRAZOLE SODIUM 40 MILLIGRAM(S): 20 TABLET, DELAYED RELEASE ORAL at 11:51

## 2019-04-28 RX ADMIN — Medication 250 MILLIGRAM(S): at 17:03

## 2019-04-28 RX ADMIN — ENOXAPARIN SODIUM 40 MILLIGRAM(S): 100 INJECTION SUBCUTANEOUS at 11:51

## 2019-04-28 NOTE — PROGRESS NOTE ADULT - SUBJECTIVE AND OBJECTIVE BOX
[place] [facility] - [facility], THE HEART CENTER                                   [address] [place] [address]                                                      PHONE: [phone]                                                         FAX: [phone]  [email or web address]  ---------------------------------------------------------------------------------------------------------------------------------    Overnight events/patient complaints: no CP      penicillin (Rash)  Sulfacetamide Sodium (Rash)    MEDICATIONS  (STANDING):  ALBUTerol/ipratropium for Nebulization 3 milliLiter(s) Nebulizer every 6 hours  aspirin  chewable 81 milliGRAM(s) Oral daily  atorvastatin 5 milliGRAM(s) Oral at bedtime  enoxaparin Injectable 40 milliGRAM(s) SubCutaneous daily  levoFLOXacin  Tablet 500 milliGRAM(s) Oral every 24 hours  pantoprazole    Tablet 40 milliGRAM(s) Oral daily  saccharomyces boulardii 250 milliGRAM(s) Oral two times a day    MEDICATIONS  (PRN):  ALPRAZolam 0.25 milliGRAM(s) Oral two times a day PRN anxiety  nitroglycerin     SubLingual 0.4 milliGRAM(s) SubLingual every 5 minutes PRN Chest Pain      Vital Signs Last 24 Hrs  T(C): 36.7 (28 Apr 2019 05:04), Max: 36.9 (27 Apr 2019 15:30)  T(F): 98 (28 Apr 2019 05:04), Max: 98.4 (27 Apr 2019 15:30)  HR: 80 (28 Apr 2019 05:04) (68 - 80)  BP: 101/68 (28 Apr 2019 05:04) (101/68 - 150/96)  BP(mean): --  RR: 18 (27 Apr 2019 20:03) (18 - 18)  SpO2: 98% (27 Apr 2019 20:03) (96% - 98%)  ICU Vital Signs Last 24 Hrs  ALMA SAUNDERS  I&O's Detail    I&O's Summary    Drug Dosing Weight  ALMA SAUNDERS      PHYSICAL EXAM:  General: NAD.  HEENT: Head; normocephalic.  Eyes: Pupils reactive.  Neck: Supple.  CARDIOVASCULAR: Normal S1 and S2.   LUNGS: Normal breath sounds bilaterally.  ABDOMEN: Soft, nontender without mass or organomegaly.  EXTREMITIES: No clubbing, cyanosis or edema.   SKIN: warm and dry.  NEURO: Alert/oriented x 3.    PSYCH: normal affect.        LABS:                        13.8   4.4   )-----------( 140      ( 26 Apr 2019 21:57 )             40.8     04-26    141  |  103  |  16.0  ----------------------------<  98  4.1   |  26.0  |  0.50    Ca    9.4      26 Apr 2019 21:57    TPro  6.9  /  Alb  3.9  /  TBili  0.7  /  DBili  x   /  AST  27  /  ALT  16  /  AlkPhos  95  04-26    ALMA SAUNDERS  CARDIAC MARKERS ( 27 Apr 2019 04:38 )  x     / <0.01 ng/mL / x     / x     / x      CARDIAC MARKERS ( 26 Apr 2019 21:57 )  x     / <0.01 ng/mL / 183 U/L / x     / 4.5 ng/mL      PT/INR - ( 26 Apr 2019 21:57 )   PT: 10.8 sec;   INR: 0.94 ratio         PTT - ( 26 Apr 2019 21:57 )  PTT:34.3 sec      RADIOLOGY & ADDITIONAL STUDIES:    INTERPRETATION OF TELEMETRY (personally reviewed): NSR 70's    ECHO:  1. Technically adequate study.  2. Normal biventricular systolic function. Left ventricular ejection   fraction, by visual estimation, is 60 to 65%. LV Ejection Fraction by   Griffith's Method with a biplane EF of 61 %.  3. Spectral Doppler shows impaired relaxation pattern of left   ventricular myocardial filling (Grade I diastolic dysfunction).  4. Normal right ventricular size and function.  5. Mild mitral valve regurgitation.  6. Trivial pericardial effusion.      **No prior echocardiograms availble for comparison.    Kamila Newsome DO Electronically signed on 4/27/2019 at 4:59:06 PM       ASSESSMENT AND PLAN:  68y Female h/o current smoker 1 PPD, carotid disease, HLD, COPD, and recent dx of scleroderma, lupus, and Sjogrens presents c/o intermittent CP and generalized fatigue concerning for UA.    1. Continue to monitor telemetry.  2. Trend CE's. Negative trop's.  3. 2D echo showed normal EF.  4. Plan for cardiac cath tomorrow to evaluate coronary anatomy.  5. Discussed with Dr. Aguilera.

## 2019-04-28 NOTE — PROGRESS NOTE ADULT - ASSESSMENT
69 yo female smoker admitted for substernal chest pain     1- ACS   cont aspirin ,  statin , LHC in am     2-Tobacco use addiction   counseling given   xanax prn for anxiety   will hold off ordering nicotine  due to chest pain r/o CAD

## 2019-04-29 ENCOUNTER — TRANSCRIPTION ENCOUNTER (OUTPATIENT)
Age: 69
End: 2019-04-29

## 2019-04-29 VITALS
HEART RATE: 66 BPM | DIASTOLIC BLOOD PRESSURE: 74 MMHG | SYSTOLIC BLOOD PRESSURE: 132 MMHG | OXYGEN SATURATION: 97 % | RESPIRATION RATE: 16 BRPM

## 2019-04-29 PROCEDURE — 82550 ASSAY OF CK (CPK): CPT

## 2019-04-29 PROCEDURE — 82553 CREATINE MB FRACTION: CPT

## 2019-04-29 PROCEDURE — 36415 COLL VENOUS BLD VENIPUNCTURE: CPT

## 2019-04-29 PROCEDURE — C1894: CPT

## 2019-04-29 PROCEDURE — 93458 L HRT ARTERY/VENTRICLE ANGIO: CPT

## 2019-04-29 PROCEDURE — 83880 ASSAY OF NATRIURETIC PEPTIDE: CPT

## 2019-04-29 PROCEDURE — G0378: CPT

## 2019-04-29 PROCEDURE — 93005 ELECTROCARDIOGRAM TRACING: CPT

## 2019-04-29 PROCEDURE — 85027 COMPLETE CBC AUTOMATED: CPT

## 2019-04-29 PROCEDURE — C1887: CPT

## 2019-04-29 PROCEDURE — 86803 HEPATITIS C AB TEST: CPT

## 2019-04-29 PROCEDURE — 85610 PROTHROMBIN TIME: CPT

## 2019-04-29 PROCEDURE — 80053 COMPREHEN METABOLIC PANEL: CPT

## 2019-04-29 PROCEDURE — 71275 CT ANGIOGRAPHY CHEST: CPT

## 2019-04-29 PROCEDURE — 71046 X-RAY EXAM CHEST 2 VIEWS: CPT

## 2019-04-29 PROCEDURE — 85730 THROMBOPLASTIN TIME PARTIAL: CPT

## 2019-04-29 PROCEDURE — 84484 ASSAY OF TROPONIN QUANT: CPT

## 2019-04-29 PROCEDURE — 99285 EMERGENCY DEPT VISIT HI MDM: CPT | Mod: 25

## 2019-04-29 PROCEDURE — 99239 HOSP IP/OBS DSCHRG MGMT >30: CPT

## 2019-04-29 PROCEDURE — 93306 TTE W/DOPPLER COMPLETE: CPT

## 2019-04-29 PROCEDURE — C1769: CPT

## 2019-04-29 RX ORDER — ASPIRIN/CALCIUM CARB/MAGNESIUM 324 MG
1 TABLET ORAL
Qty: 30 | Refills: 0 | OUTPATIENT
Start: 2019-04-29 | End: 2019-05-28

## 2019-04-29 RX ORDER — SACCHAROMYCES BOULARDII 250 MG
1 POWDER IN PACKET (EA) ORAL
Qty: 14 | Refills: 0 | OUTPATIENT
Start: 2019-04-29 | End: 2019-05-05

## 2019-04-29 RX ADMIN — Medication 0.25 MILLIGRAM(S): at 09:06

## 2019-04-29 RX ADMIN — Medication 81 MILLIGRAM(S): at 11:07

## 2019-04-29 NOTE — DISCHARGE NOTE NURSING/CASE MANAGEMENT/SOCIAL WORK - NSDCDPATPORTLINK_GEN_ALL_CORE
You can access the [facility] Patient Portal, offered by [facility], by registering with the following website: [email or web address]

## 2019-04-29 NOTE — DISCHARGE NOTE PROVIDER - HOSPITAL COURSE
Patient was admitted with chest pain. Seen by Cardiology and was recommended Cardiac Cath - it was non obstructive. ACS was ruled out.     CT Chest showed:    Nonspecific small ground glass opacity in the right upper lobe, which may represent a focus of infection or inflammation although neoplasm cannot be excluded. Recommend a 6-12 month follow-up.    She was started on Levofloxacin. She is counselled extensively to stop smoking.    She is feeling better and is stable to be discharged home.         Vital Signs     T(C): 36.6 (29 Apr 2019 11:00), Max: 36.8 (28 Apr 2019 21:22)    T(F): 97.9 (29 Apr 2019 11:00), Max: 98.2 (28 Apr 2019 21:22)    HR: 68 (29 Apr 2019 16:00) (64 - 80)    BP: 156/69 (29 Apr 2019 16:00) (100/62 - 164/69)    RR: 15 (29 Apr 2019 16:00) (14 - 17)    SpO2: 96% (29 Apr 2019 16:00) (95% - 98%)    General: Well developed. Well nourished. No acute distress    HEENT: PERRLA. EOMI. Clear conjunctivae. Moist mucus membrane    Neck: Supple.     Chest: CTA bilaterally - no wheezing, rales or rhonchi. No chest wall tenderness.    Heart: Normal S1 & S2 with RRR. No murmur.     Abdomen: Soft. Non-tender. Non-distended. + BS    Ext: No pedal edema. No calf tenderness     Neuro: AAO x 3. No focal deficit.   No speech disorder.    Skin: Warm and Dry    Psychiatry: Normal mood and affect        Time spent: 46 minutes

## 2019-04-29 NOTE — PROGRESS NOTE ADULT - SUBJECTIVE AND OBJECTIVE BOX
69 y/o F pt with hx of COPD, HLD, scleroderma, lupus, and Sjogren's presents to ED yesterday with c/o central, lower sternal  squeezing CP radiating to her back shoulder blade bilaterally . Pt syas pain has been constatnt on off worsening for about 1 week associated with dizziness and feeling extremely fatigue any exertion . She also has mild cough cronic and shortness of breath after intense exertion . She describes the pain like  a knot in the center of her chest and back pain spreads across her shoulder blades.  She has never had these symptoms before. Pt has not seen a cardiologist in the last 10 years. Pt had a stress test a "few" years ago, unsure of exact time. Denies fever, chills, SOB, abd pain, n/v and diaphoresis. No further complaints at this time.   She went to urgent care before coming to the ED and send for further work up, pain is better today kept on observation unit seen by cardiologist thsi morning and decided to admit for LHC on Monday . Pt is smoker heavy for over 58 years      TTE Echo Complete w/Doppler  4.27.19 @ 10:58   1. Technically adequate study.   2. Normal biventricular systolic function. Left ventricular ejection   fraction, by visual estimation, is 60 to 65%. LV Ejection Fraction by   Griffith's Method with a biplane EF of 61 %.   3. Spectral Doppler shows impaired relaxation pattern of left   ventricular myocardial filling (Grade I diastolic dysfunction).   4. Normal right ventricular size and function.   5. Mild mitral valve regurgitation.   6. Trivial pericardial effusion.      **No prior echocardiograms availble for comparison.

## 2019-04-29 NOTE — DISCHARGE NOTE PROVIDER - CARE PROVIDER_API CALL
Aaron Bal  [address], [place] [address]  Phone: [phone]  Fax: (   )    -  Follow Up Time:     Redd Blank)  Cardiac Electrophysiology; Cardiovascular Disease  [address] [place] [id number]  Phone: [phone]  Fax: [phone]  Follow Up Time:

## 2019-04-29 NOTE — DISCHARGE NOTE PROVIDER - NSDCCPCAREPLAN_GEN_ALL_CORE_FT
PRINCIPAL DISCHARGE DIAGNOSIS  Diagnosis: ACS (acute coronary syndrome)  Assessment and Plan of Treatment: Ruled out.  Follow up with Cardio in 2 weeks.

## 2019-04-29 NOTE — DISCHARGE NOTE PROVIDER - PROVIDER TOKENS
FREE:[LAST:[Valeriano],FIRST:[Aaron],PHONE:[phone]],FAX:[phone]   [phone]    -],ADDRESS:[ [place] [address]]],PROVIDER:[TOKEN:[[id number]]]

## 2019-04-29 NOTE — PROGRESS NOTE ADULT - SUBJECTIVE AND OBJECTIVE BOX
S/P cath  Non obstructive CAD  Right groin benign  Plan:  Medical management  Bedrest for 2 hours then ad mikey as ordered  Will monitor.  Follow up with Hospitalist for further medical care  Follow up with cardiology as outpatient in 7-10 days.

## 2019-05-10 ENCOUNTER — APPOINTMENT (OUTPATIENT)
Dept: RHEUMATOLOGY | Facility: CLINIC | Age: 69
End: 2019-05-10
Payer: MEDICARE

## 2019-05-10 VITALS
BODY MASS INDEX: 17.72 KG/M2 | OXYGEN SATURATION: 93 % | HEART RATE: 77 BPM | HEIGHT: 63 IN | WEIGHT: 100 LBS | TEMPERATURE: 98.6 F | SYSTOLIC BLOOD PRESSURE: 129 MMHG | DIASTOLIC BLOOD PRESSURE: 79 MMHG

## 2019-05-10 DIAGNOSIS — R76.8 OTHER SPECIFIED ABNORMAL IMMUNOLOGICAL FINDINGS IN SERUM: ICD-10-CM

## 2019-05-10 DIAGNOSIS — M15.9 POLYOSTEOARTHRITIS, UNSPECIFIED: ICD-10-CM

## 2019-05-10 DIAGNOSIS — M35.00 SICCA SYNDROME, UNSPECIFIED: ICD-10-CM

## 2019-05-10 DIAGNOSIS — M35.8 OTHER SPECIFIED SYSTEMIC INVOLVEMENT OF CONNECTIVE TISSUE: ICD-10-CM

## 2019-05-10 DIAGNOSIS — F17.200 NICOTINE DEPENDENCE, UNSPECIFIED, UNCOMPLICATED: ICD-10-CM

## 2019-05-10 DIAGNOSIS — I73.00 RAYNAUD'S SYNDROME W/OUT GANGRENE: ICD-10-CM

## 2019-05-10 DIAGNOSIS — H57.89 OTHER SPECIFIED DISORDERS OF EYE AND ADNEXA: ICD-10-CM

## 2019-05-10 DIAGNOSIS — M35.1 OTHER OVERLAP SYNDROMES: ICD-10-CM

## 2019-05-10 DIAGNOSIS — Z79.899 OTHER LONG TERM (CURRENT) DRUG THERAPY: ICD-10-CM

## 2019-05-10 PROCEDURE — 99215 OFFICE O/P EST HI 40 MIN: CPT | Mod: 25

## 2019-05-10 PROCEDURE — 36415 COLL VENOUS BLD VENIPUNCTURE: CPT

## 2019-05-10 RX ORDER — BUPROPION HYDROCHLORIDE 100 MG/1
TABLET, FILM COATED ORAL
Refills: 0 | Status: DISCONTINUED | COMMUNITY
End: 2019-05-10

## 2019-05-10 RX ORDER — DULOXETINE HYDROCHLORIDE 30 MG/1
30 CAPSULE, DELAYED RELEASE PELLETS ORAL
Qty: 30 | Refills: 2 | Status: DISCONTINUED | COMMUNITY
Start: 2018-12-28 | End: 2019-05-10

## 2019-05-10 RX ORDER — METHOTREXATE 2.5 MG/1
2.5 TABLET ORAL
Qty: 16 | Refills: 2 | Status: DISCONTINUED | COMMUNITY
Start: 2018-12-28 | End: 2019-05-10

## 2019-05-13 PROBLEM — R76.8 RIBONUCLEOPROTEIN ANTIBODY POSITIVE: Status: ACTIVE | Noted: 2018-04-26

## 2019-05-13 PROBLEM — I73.00 RAYNAUD'S PHENOMENON: Status: ACTIVE | Noted: 2018-12-28

## 2019-05-13 PROBLEM — M35.8 OVERLAP SYNDROME: Status: ACTIVE | Noted: 2018-12-28

## 2019-05-13 PROBLEM — M35.00 SJOGREN'S SYNDROME: Status: ACTIVE | Noted: 2018-04-26

## 2019-05-13 PROBLEM — F17.200 CURRENT SMOKER: Status: ACTIVE | Noted: 2018-07-02

## 2019-05-13 PROBLEM — R76.8 POSITIVE SMITH ANTIBODY: Status: ACTIVE | Noted: 2018-07-02

## 2019-05-13 PROBLEM — M15.9 GENERALIZED OSTEOARTHRITIS: Status: ACTIVE | Noted: 2018-04-26

## 2019-05-13 PROBLEM — R76.8 SCL-70 ANTIBODY POSITIVE: Status: ACTIVE | Noted: 2018-07-02

## 2019-05-13 PROBLEM — Z79.899 ON METHOTREXATE THERAPY: Status: RESOLVED | Noted: 2018-12-28 | Resolved: 2019-05-13

## 2019-05-13 PROBLEM — R76.8 POSITIVE DOUBLE STRANDED DNA ANTIBODY TEST: Status: ACTIVE | Noted: 2018-08-07

## 2019-05-13 PROBLEM — M35.1 MIXED CONNECTIVE TISSUE DISEASE: Status: ACTIVE | Noted: 2018-05-17

## 2019-05-13 PROBLEM — H57.89 ITCHY, WATERY, AND RED EYE: Status: ACTIVE | Noted: 2018-12-28

## 2019-05-23 ENCOUNTER — RX RENEWAL (OUTPATIENT)
Age: 69
End: 2019-05-23

## 2019-05-23 ENCOUNTER — APPOINTMENT (OUTPATIENT)
Dept: OPHTHALMOLOGY | Facility: CLINIC | Age: 69
End: 2019-05-23
Payer: MEDICARE

## 2019-05-23 DIAGNOSIS — H01.115 ALLERGIC DERMATITIS OF RIGHT UPPER EYELID: ICD-10-CM

## 2019-05-23 DIAGNOSIS — H01.114 ALLERGIC DERMATITIS OF RIGHT UPPER EYELID: ICD-10-CM

## 2019-05-23 DIAGNOSIS — H04.123 DRY EYE SYNDROME OF BILATERAL LACRIMAL GLANDS: ICD-10-CM

## 2019-05-23 DIAGNOSIS — H01.111 ALLERGIC DERMATITIS OF RIGHT UPPER EYELID: ICD-10-CM

## 2019-05-23 DIAGNOSIS — H01.112 ALLERGIC DERMATITIS OF RIGHT UPPER EYELID: ICD-10-CM

## 2019-05-23 PROCEDURE — 68761 CLOSE TEAR DUCT OPENING: CPT | Mod: E1,E2,E3,E4

## 2019-05-23 PROCEDURE — 92012 INTRM OPH EXAM EST PATIENT: CPT | Mod: 25

## 2019-05-23 RX ORDER — FLUOROMETHOLONE 1 MG/G
0.1 OINTMENT OPHTHALMIC 3 TIMES DAILY
Qty: 1 | Refills: 1 | Status: ACTIVE | COMMUNITY
Start: 2019-01-17 | End: 1900-01-01

## 2019-05-24 ENCOUNTER — RX RENEWAL (OUTPATIENT)
Age: 69
End: 2019-05-24

## 2019-05-24 RX ORDER — LOTEPREDNOL ETABONATE 5 MG/G
0.5 OINTMENT OPHTHALMIC 3 TIMES DAILY
Qty: 1 | Refills: 2 | Status: ACTIVE | COMMUNITY
Start: 2019-01-22 | End: 1900-01-01

## 2019-06-03 PROBLEM — H57.89 EYE INFLAMMATION: Status: ACTIVE | Noted: 2018-04-26

## 2019-09-26 ENCOUNTER — NON-APPOINTMENT (OUTPATIENT)
Age: 69
End: 2019-09-26

## 2019-09-26 ENCOUNTER — APPOINTMENT (OUTPATIENT)
Dept: OPHTHALMOLOGY | Facility: CLINIC | Age: 69
End: 2019-09-26
Payer: MEDICARE

## 2019-09-26 PROCEDURE — 68761 CLOSE TEAR DUCT OPENING: CPT | Mod: E1,E2,E3,E4

## 2019-09-26 PROCEDURE — 92012 INTRM OPH EXAM EST PATIENT: CPT | Mod: 25

## 2019-11-04 ENCOUNTER — RX RENEWAL (OUTPATIENT)
Age: 69
End: 2019-11-04

## 2019-11-04 RX ORDER — FOLIC ACID 1 MG/1
1 TABLET ORAL DAILY
Qty: 30 | Refills: 11 | Status: ACTIVE | COMMUNITY
Start: 2018-12-28 | End: 1900-01-01

## 2019-12-06 LAB
ALBUMIN SERPL ELPH-MCNC: 4.3 G/DL
ALP BLD-CCNC: 82 U/L
ALT SERPL-CCNC: 16 U/L
ANION GAP SERPL CALC-SCNC: 10 MMOL/L
AST SERPL-CCNC: 16 U/L
BASOPHILS # BLD AUTO: 0.04 K/UL
BASOPHILS NFR BLD AUTO: 1 %
BILIRUB SERPL-MCNC: 0.8 MG/DL
BUN SERPL-MCNC: 13 MG/DL
CALCIUM SERPL-MCNC: 9.5 MG/DL
CHLORIDE SERPL-SCNC: 102 MMOL/L
CO2 SERPL-SCNC: 31 MMOL/L
CREAT SERPL-MCNC: 0.66 MG/DL
CRP SERPL-MCNC: <0.1 MG/DL
EOSINOPHIL # BLD AUTO: 0.08 K/UL
EOSINOPHIL NFR BLD AUTO: 1.9 %
ERYTHROCYTE [SEDIMENTATION RATE] IN BLOOD BY WESTERGREN METHOD: 5 MM/HR
GLUCOSE SERPL-MCNC: 76 MG/DL
HCT VFR BLD CALC: 41.9 %
HGB BLD-MCNC: 13.5 G/DL
IMM GRANULOCYTES NFR BLD AUTO: 0.2 %
LYMPHOCYTES # BLD AUTO: 1.29 K/UL
LYMPHOCYTES NFR BLD AUTO: 30.7 %
MAN DIFF?: NORMAL
MCHC RBC-ENTMCNC: 30.8 PG
MCHC RBC-ENTMCNC: 32.2 GM/DL
MCV RBC AUTO: 95.4 FL
MISCELLANEOUS TEST: NORMAL
MONOCYTES # BLD AUTO: 0.3 K/UL
MONOCYTES NFR BLD AUTO: 7.1 %
NEUTROPHILS # BLD AUTO: 2.48 K/UL
NEUTROPHILS NFR BLD AUTO: 59.1 %
PLATELET # BLD AUTO: 192 K/UL
POTASSIUM SERPL-SCNC: 4.2 MMOL/L
PROC NAME: NORMAL
PROT SERPL-MCNC: 6.6 G/DL
RBC # BLD: 4.39 M/UL
RBC # FLD: 11.2 %
SODIUM SERPL-SCNC: 143 MMOL/L
WBC # FLD AUTO: 4.2 K/UL

## 2019-12-26 ENCOUNTER — APPOINTMENT (OUTPATIENT)
Dept: OPHTHALMOLOGY | Facility: CLINIC | Age: 69
End: 2019-12-26
Payer: MEDICARE

## 2019-12-26 ENCOUNTER — NON-APPOINTMENT (OUTPATIENT)
Age: 69
End: 2019-12-26

## 2019-12-26 PROCEDURE — 68761 CLOSE TEAR DUCT OPENING: CPT | Mod: E2,E4

## 2019-12-26 PROCEDURE — 92012 INTRM OPH EXAM EST PATIENT: CPT | Mod: 25

## 2019-12-26 PROCEDURE — 92134 CPTRZ OPH DX IMG PST SGM RTA: CPT

## 2023-10-04 NOTE — PATIENT PROFILE ADULT - NSPROPTRIGHTNOTIFY_GEN_A_NUR
Ishan Barillas presents today for   Chief Complaint   Patient presents with    Establish Care     The week of 8/14/23 tongue was white with patches. [facility] diagnosed with thrush. Antibiotics for a week but no relief. She states that she had some burning of the tongue. 9/17/23 [facility] she was given fluconazole for two weeks. She states that she is still having sore throat off and on with the same symptoms of red patches and white thrush on her tongue. Patient states that burning subsided. Patient states that she feels extremely thirsty. Other     Patient  has Flu shot. Is someone accompanying this pt? No     Is the patient using any DME equipment during OV? No     Depression Screening:      10/4/2023     9:48 AM   PHQ-9 Questionaire   Little interest or pleasure in doing things 0   Feeling down, depressed, or hopeless 0   PHQ-9 Total Score 0        FRANCES 7-Anxiety        No data to display                 Learning Assessment:  Who is the primary learner? Patient    What is the preferred language for health care of the primary learner? ENGLISH    How does the primary learner prefer to learn new concepts? DEMONSTRATION    Answered By patient    Relationship to Learner SELF    Highest level of education completed by primary learner? GRADUATED HIGH SCHOOL OR GED    Are there any barriers / factors that could impact learning? NONE    Will there be a co-learner / caregiver? No         Fall Risk       No data to display                   Travel Screening:    Travel Screening       Question Response    Have you been in contact with someone who was sick? No / Unsure    Do you have any of the following new or worsening symptoms? None of these    Have you traveled internationally or domestically in the last month?  No          Travel History   Travel since 09/04/23    No documented travel since 09/04/23          Health Maintenance reviewed and discussed
Mary Ellen Can is a 34 y.o. female is seen on 10/4/2023 for Establish Care (The week of 23 tongue was white with patches. [facility] 23 diagnosed with thrush. Antibiotics for a week but no relief. She states that she had some burning of the tongue. 23 [facility] she was given fluconazole for two weeks. She states that she is still having sore throat off and on with the same symptoms of red patches and white thrush on her tongue. Patient states that burning subsided. Patient states that she feels extremely thirsty. ) and Other (Patient  has Flu shot. )    Assessment & Plan:     1. Oral thrush  Comments:  Recurrent, consult ENT for further eval  Orders:  -     External Referral To ENT  -     CBC with Auto Differential; Future  -     Comprehensive Metabolic Panel; Future  2. Hair loss  Comments:  New-onset, check labs, TSH added per patient request  Orders:  -     TSH; Future  3. Need for hepatitis C screening test  -     Hepatitis C Antibody; Future  4. Screening for lipid disorders  -     Lipid Panel; Future  5. Needs flu shot  Comments:  Already received  6. Encounter to establish care    Follow-up and Dispositions    Return in about 4 weeks (around 2023) for PHYSICAL, lab results. Subjective:     HPI    Previous PCP: None  Reason for switching: n/a    Social Hx:  Occupation-  for [employer]- lives with in-laws and , child  ETOH use- none  Recreational drug use- none  Tobacco use- none    Gyn Hx:  Last PAP:  with [facility], [facility] form completed today    Miscarriage:  1  : 0  Date of LMP:   Hx of STDs: HPV when in college but has cleared up  Birth Control: IUD (inserted in 2018)  Menopause: n/a  Hysterectomy: n/a  Last mammogram- n/a    Family Hx:  HTN- father?   Diabetes- father  HLD- father  MI- none  Stroke - none  Cancer- MGM (breast), MGF (unsure
declines
